# Patient Record
Sex: FEMALE | Race: BLACK OR AFRICAN AMERICAN | URBAN - METROPOLITAN AREA
[De-identification: names, ages, dates, MRNs, and addresses within clinical notes are randomized per-mention and may not be internally consistent; named-entity substitution may affect disease eponyms.]

---

## 2018-03-03 ENCOUNTER — OFFICE VISIT (OUTPATIENT)
Dept: URGENT CARE | Facility: URGENT CARE | Age: 18
End: 2018-03-03
Payer: COMMERCIAL

## 2018-03-03 VITALS
TEMPERATURE: 98.1 F | BODY MASS INDEX: 23.36 KG/M2 | WEIGHT: 119 LBS | HEART RATE: 88 BPM | DIASTOLIC BLOOD PRESSURE: 60 MMHG | HEIGHT: 60 IN | RESPIRATION RATE: 16 BRPM | SYSTOLIC BLOOD PRESSURE: 102 MMHG

## 2018-03-03 DIAGNOSIS — K59.00 CONSTIPATION, UNSPECIFIED CONSTIPATION TYPE: ICD-10-CM

## 2018-03-03 DIAGNOSIS — R35.0 URINARY FREQUENCY: Primary | ICD-10-CM

## 2018-03-03 DIAGNOSIS — N92.6 MISSED MENSES: ICD-10-CM

## 2018-03-03 DIAGNOSIS — R10.31 BILATERAL LOWER ABDOMINAL PAIN: ICD-10-CM

## 2018-03-03 DIAGNOSIS — R10.32 BILATERAL LOWER ABDOMINAL PAIN: ICD-10-CM

## 2018-03-03 LAB
ALBUMIN UR-MCNC: 100 MG/DL
APPEARANCE UR: ABNORMAL
BETA HCG QUAL IFA URINE: NEGATIVE
BILIRUB UR QL STRIP: NEGATIVE
COLOR UR AUTO: ABNORMAL
GLUCOSE UR STRIP-MCNC: NEGATIVE MG/DL
HGB UR QL STRIP: ABNORMAL
KETONES UR STRIP-MCNC: NEGATIVE MG/DL
LEUKOCYTE ESTERASE UR QL STRIP: NEGATIVE
NITRATE UR QL: NEGATIVE
NON-SQ EPI CELLS #/AREA URNS LPF: ABNORMAL /LPF
PH UR STRIP: 8 PH (ref 5–7)
RBC #/AREA URNS AUTO: >100 /HPF
SOURCE: ABNORMAL
SP GR UR STRIP: 1.01 (ref 1–1.03)
UROBILINOGEN UR STRIP-ACNC: 0.2 EU/DL (ref 0.2–1)
WBC #/AREA URNS AUTO: ABNORMAL /HPF

## 2018-03-03 PROCEDURE — 84703 CHORIONIC GONADOTROPIN ASSAY: CPT | Performed by: FAMILY MEDICINE

## 2018-03-03 PROCEDURE — 87491 CHLMYD TRACH DNA AMP PROBE: CPT | Performed by: FAMILY MEDICINE

## 2018-03-03 PROCEDURE — 81001 URINALYSIS AUTO W/SCOPE: CPT | Performed by: FAMILY MEDICINE

## 2018-03-03 PROCEDURE — 99203 OFFICE O/P NEW LOW 30 MIN: CPT | Performed by: FAMILY MEDICINE

## 2018-03-03 PROCEDURE — 87591 N.GONORRHOEAE DNA AMP PROB: CPT | Performed by: FAMILY MEDICINE

## 2018-03-03 RX ORDER — POLYETHYLENE GLYCOL 3350 17 G/17G
1 POWDER, FOR SOLUTION ORAL DAILY
Qty: 510 G | Refills: 1 | Status: SHIPPED | OUTPATIENT
Start: 2018-03-03

## 2018-03-03 RX ORDER — IBUPROFEN 600 MG/1
600 TABLET, FILM COATED ORAL EVERY 6 HOURS PRN
Qty: 60 TABLET | Refills: 1 | Status: SHIPPED | OUTPATIENT
Start: 2018-03-03

## 2018-03-03 NOTE — MR AVS SNAPSHOT
"              After Visit Summary   3/3/2018    Paula Venegas    MRN: [id number]           Patient Information     Date Of Birth          2000        Visit Information        Provider Department      3/3/2018 5:25 PM Luz Serna MD [facility]        Today's Diagnoses     Urinary frequency    -  1    Missed menses        Bilateral lower abdominal pain        Constipation, unspecified constipation type           Follow-ups after your visit        Who to contact     If you have questions or need follow up information about today's clinic visit or your schedule please contact [facility] directly at [phone].  Normal or non-critical lab and imaging results will be communicated to you by [facility], letter or phone within 4 business days after the clinic has received the results. If you do not hear from us within 7 days, please contact the clinic through [facility] or phone. If you have a critical or abnormal lab result, we will notify you by phone as soon as possible.  Submit refill requests through [facility] or call your pharmacy and they will forward the refill request to us. Please allow 3 business days for your refill to be completed.          Additional Information About Your Visit        MyChart Information     CUPR lets you send messages to your doctor, view your test results, renew your prescriptions, schedule appointments and more. To sign up, go to [email or web address] . Click on \"Log in\" on the left side of the screen, which will take you to the Welcome page. Then click on \"Sign up Now\" on the right side of the page.     You will be asked to enter the access code listed below, as well as some personal information. Please follow the directions to create your username and password.     Your access code is: [email or web address]  Expires: 2018  7:31 PM     Your access code will  in 90 days. If you need help or a new code, please call your " [facility] or [phone].        Care EveryWhere ID     This is your Care EveryWhere ID. This could be used by other organizations to access your [facility] medical records  [id number]        Your Vitals Were     Pulse Temperature Respirations Height Last Period BMI (Body Mass Index)    88 98.1  F (36.7  C) 16 5' (1.524 m) 02/01/2018 (Approximate) 23.24 kg/m2       Blood Pressure from Last 3 Encounters:   03/03/18 102/60    Weight from Last 3 Encounters:   03/03/18 119 lb (54 kg) (34 %)*     * Growth percentiles are based on CDC 2-20 Years data.              We Performed the Following     Beta HCG qual IFA urine     CHLAMYDIA TRACHOMATIS PCR     NEISSERIA GONORRHOEA PCR     UA with Microscopic reflex to Culture          Today's Medication Changes          These changes are accurate as of 3/3/18  7:31 PM.  If you have any questions, ask your nurse or doctor.               Start taking these medicines.        Dose/Directions    ibuprofen 600 MG tablet   Commonly known as:  ADVIL/MOTRIN   Used for:  Bilateral lower abdominal pain        Dose:  600 mg   Take 1 tablet (600 mg) by mouth every 6 hours as needed for moderate pain   Quantity:  60 tablet   Refills:  1       polyethylene glycol powder   Commonly known as:  MIRALAX   Used for:  Constipation, unspecified constipation type        Dose:  1 capful   Take 17 g (1 capful) by mouth daily   Quantity:  510 g   Refills:  1            Where to get your medicines      These medications were sent to [facility] [address] [address]     Phone:  [phone]     ibuprofen 600 MG tablet    polyethylene glycol powder                Primary Care Provider Office Phone # Fax #    [facility] [phone] [address] [address]        Equal Access to Services     TAYLOR ROME AH: charissa Case qaybta kaalmada adeegyada,  ravindra almeidapurnima marcelo'aan ah. So [facility] [phone].    ATENCIÓN: Si habla asiya, tiene a tejeda disposición servicios gratuitos de asistencia lingüística. Ismael al [phone].    We comply with applicable federal civil rights laws and [place] laws. We do not discriminate on the basis of race, color, national origin, age, disability, sex, sexual orientation, or gender identity.            Thank you!     Thank you for choosing [facility]  for your care. Our goal is always to provide you with excellent care. Hearing back from our patients is one way we can continue to improve our services. Please take a few minutes to complete the written survey that you may receive in the mail after your visit with us. Thank you!             Your Updated Medication List - Protect others around you: Learn how to safely use, store and throw away your medicines at www.disposemymeds.org.          This list is accurate as of 3/3/18  7:31 PM.  Always use your most recent med list.                   Brand Name Dispense Instructions for use Diagnosis    ibuprofen 600 MG tablet    ADVIL/MOTRIN    60 tablet    Take 1 tablet (600 mg) by mouth every 6 hours as needed for moderate pain    Bilateral lower abdominal pain       polyethylene glycol powder    MIRALAX    510 g    Take 17 g (1 capful) by mouth daily    Constipation, unspecified constipation type

## 2018-03-03 NOTE — NURSING NOTE
Chief Complaint   Patient presents with     Abdominal Pain     heavy cramping, abdominal pain, breast tenderness, back pain, home pregnancy test was negative 2 days ago, ongoing for the past few weeks, as become more intense     Mass     rt breast lump        Initial /60  Pulse 88  Temp 98.1  F (36.7  C)  Resp 16  Ht 5' (1.524 m)  Wt 119 lb (54 kg)  LMP 02/01/2018 (Approximate)  BMI 23.24 kg/m2 Estimated body mass index is 23.24 kg/(m^2) as calculated from the following:    Height as of this encounter: 5' (1.524 m).    Weight as of this encounter: 119 lb (54 kg).  Medication Reconciliation: complete     Anni Alicia, CMA

## 2018-03-04 NOTE — PROGRESS NOTES
Chief Complaint   Patient presents with     Abdominal Pain     heavy cramping, abdominal pain, breast tenderness, back pain, home pregnancy test was negative 2 days ago, ongoing for the past few weeks, as become more intense, nausea     Mass     rt breast lump      SUBJECTIVE:  Paula Venegas, a 17 year old female scheduled an appointment to discuss the following issues:     Urinary frequency  Missed menses  Bilateral lower abdominal pain  Constipation, unspecified constipation type     She is here with symptoms of urinary frequency and lower abd pain for last few weeks   She has h/o constipation and today had a hard BM, she has h/o painful periods  Her LMP was beginning feb and her preg test is negative today   She started her cycle today ,  She denies any abnormal vaginal discharge today   No other GI symptoms today     No past medical history on file.   No past surgical history on file.     Social History     Social History     Marital status: Single     Spouse name: N/A     Number of children: N/A     Years of education: N/A     Occupational History     Not on file.     Social History Main Topics     Smoking status: Not on file     Smokeless tobacco: Not on file     Alcohol use Not on file     Drug use: Not on file     Sexual activity: Not on file     Other Topics Concern     Not on file     Social History Narrative        Current Outpatient Prescriptions   Medication Sig Dispense Refill     ibuprofen (ADVIL/MOTRIN) 600 MG tablet Take 1 tablet (600 mg) by mouth every 6 hours as needed for moderate pain 60 tablet 1     polyethylene glycol (MIRALAX) powder Take 17 g (1 capful) by mouth daily 510 g 1       Health Maintenance   Topic Date Due     CHLAMYDIA SCREENING  2000     PEDS DTAP/TDAP (1 - Tdap) 12/01/2005     HPV IMMUNIZATION (1 of 3 - Female 3 Dose Series) 12/01/2009     TETANUS IMMUNIZATION (SYSTEM ASSIGNED)  12/01/2016     INFLUENZA VACCINE (SYSTEM ASSIGNED)  09/01/2018         ROS:  CONSTITUTIONAL:no fever, no chills or sweats, no excessive fatigue, no significant change in weight  CV: neg   RESP -neg  GI:  Lower abd pain  NEURO: neg   MSK - neg   Skin - neg   Pyschiatry-neg     OBJECTIVE:  /60  Pulse 88  Temp 98.1  F (36.7  C)  Resp 16  Ht 5' (1.524 m)  Wt 119 lb (54 kg)  LMP 02/01/2018 (Approximate)  BMI 23.24 kg/m2      EXAM:  GENERAL APPEARANCE: healthy, alert and no distress  EYES: EOMI,  PERRL  Breast -Breasts are symmetric.  No dominant, discrete, fixed  or suspicious masses are noted.  Mild symmetric fibrocystic densities are noted in both upper outer quadrants. No skin or nipple changes or axillary nodes.   Both breast were tender ,    HENT: ear canals and TM's normal and nose and mouth without ulcers or lesions  RESP: lungs clear to auscultation - no rales, rhonchi or wheezes  CV: regular rates and rhythm, normal S1 S2, no S3 or S4 and no murmur, click or rub -  ABDOMEN:  soft, nontender, no HSM or masses and bowel sounds normal  PSYCH: mentation appears normal and affect normal/bright        ASSESSMENT/PLAN:  Paula was seen today for abdominal pain and mass.    Diagnoses and all orders for this visit:    Urinary frequency  -     UA with Microscopic reflex to Culture  -     NEISSERIA GONORRHOEA PCR  -     CHLAMYDIA TRACHOMATIS PCR    Missed menses  -     Beta HCG qual IFA urine    Bilateral lower abdominal pain  -     ibuprofen (ADVIL/MOTRIN) 600 MG tablet; Take 1 tablet (600 mg) by mouth every 6 hours as needed for moderate pain    Constipation, unspecified constipation type  -     polyethylene glycol (MIRALAX) powder; Take 17 g (1 capful) by mouth daily        Follow up if  symptoms fail to improve or worsens   Pt understood and agreed with plan         Spent>25 minutes with patient and > 50% of the time was for counselling       Luz Serna MD

## 2018-03-04 NOTE — NURSING NOTE
Miralax and Ibuprofen prescription was called into the [facility] on [address].     Anni Alicia, CMA

## 2018-03-05 LAB
C TRACH DNA SPEC QL NAA+PROBE: NEGATIVE
N GONORRHOEA DNA SPEC QL NAA+PROBE: NEGATIVE
SPECIMEN SOURCE: NORMAL
SPECIMEN SOURCE: NORMAL